# Patient Record
Sex: FEMALE | Race: BLACK OR AFRICAN AMERICAN | Employment: FULL TIME | URBAN - METROPOLITAN AREA
[De-identification: names, ages, dates, MRNs, and addresses within clinical notes are randomized per-mention and may not be internally consistent; named-entity substitution may affect disease eponyms.]

---

## 2018-09-13 ENCOUNTER — DOCUMENTATION ONLY (OUTPATIENT)
Dept: SURGERY | Age: 53
End: 2018-09-13

## 2018-09-13 NOTE — LETTER
[facility] [place] [facility] 
 
 
Dear Patient, Your health is our main concern. It is important for your health to have follow-up lab work and to see you surgeon at 2 months, 4 months, 6 months, 9 months and annually after your weight loss surgery. Additionally, the Department of Bariatric Surgery at our hospital is a member of the [employer] [address] Surgical Quality Improvement Program ([facility] NSQIP). As a participant in this program, we gather information on the outcomes of our patients after surgery. Please call the office for a follow up appointment at [phone]. If you have moved out of the area or have changed surgeons please call us and let us know the name of your doctor. Your health and feedback are important to us. We greatly appreciate your response. Thank you, [facility]

## 2018-10-29 ENCOUNTER — TELEPHONE (OUTPATIENT)
Dept: SURGERY | Age: 53
End: 2018-10-29

## 2019-05-09 ENCOUNTER — DOCUMENTATION ONLY (OUTPATIENT)
Dept: SURGERY | Age: 54
End: 2019-05-09

## 2019-05-09 NOTE — LETTER
[facility] [place] [facility] 
 
 
Dear Patient, Your health is our main concern. It is important for your health to have follow-up lab work and to see you surgeon at 2 months, 4 months, 6 months, 9 months and annually after your weight loss surgery. Additionally, the Department of Bariatric Surgery at our hospital is a member of the [employer] [address] Surgical Quality Improvement Program ([facility] NSQIP). As a participant in this program, we gather information on the outcomes of our patients after surgery. Please call the office for a follow up appointment at [phone]. If you have moved out of the area or have changed surgeons please call us and let us know the name of your doctor. Your health and feedback are important to us. We greatly appreciate your response. Thank you, [facility]

## 2019-05-30 ENCOUNTER — DOCUMENTATION ONLY (OUTPATIENT)
Dept: SURGERY | Age: 54
End: 2019-05-30

## 2019-05-30 NOTE — PROGRESS NOTES
Per [facility] requirements;  E-mail sent for follow up appointment. [facility] [place]  [facility]  [facility]      Dear Patient,    Your health is our main concern. It is important for your health to have follow-up lab work and to see you surgeon at 2 months, 4 months, 6 months, 9 months and annually after your weight loss surgery. Additionally, the Department of Bariatric Surgery at our hospital is a member of the [employer] [address] Surgical Quality Improvement Program ([facility] NSQIP). As a participant in this program, we gather information on the outcomes of our patients after surgery. Please call the office for a follow up appointment at [phone]. If you have moved out of the area or have changed surgeons please call us and let us know the name of your doctor. Your health and feedback are important to us. We greatly appreciate your response.        Thank you,  [facility]

## 2021-05-04 ENCOUNTER — HOSPITAL ENCOUNTER (OUTPATIENT)
Dept: PHYSICAL THERAPY | Age: 56
Discharge: HOME | End: 2021-05-04
Payer: COMMERCIAL

## 2021-05-04 PROCEDURE — 97140 MANUAL THERAPY 1/> REGIONS: CPT

## 2021-05-04 PROCEDURE — 97162 PT EVAL MOD COMPLEX 30 MIN: CPT

## 2021-05-04 PROCEDURE — 97110 THERAPEUTIC EXERCISES: CPT

## 2021-05-04 NOTE — PROGRESS NOTES
PT DAILY TREATMENT NOTE     Patient Name: Damian De Los Santos  Date:2021  : 1965  [x]  Patient  Verified  Payor: Shreya Alex / Plan: [facility] [employer] PPO / Product Type: PPO /    In time:832a  Out time:2a  Total Treatment Time (min): 50  Visit #: 1 of     Medicare/[employer] Only   Total Timed Codes (min):  23 1:1 Treatment Time:  50       Treatment Area: Right knee pain [M25.561]    SUBJECTIVE  Pain Level (0-10 scale): 3  Any medication changes, allergies to medications, adverse drug reactions, diagnosis change, or new procedure performed?: [x] No    [] Yes (see summary sheet for update)  Subjective functional status/changes:   [] No changes reported  Pt initial eval see POC for details    OBJECTIVE    Modality rationale: decrease pain to improve the patient's ability to tolerate ADLs   Min Type Additional Details    [] Estim:  []Unatt       []IFC  []Premod                        []Other:  []w/ice   []w/heat  Position:  Location:    [] Estim: []Att    []TENS instruct  []NMES                    []Other:  []w/US   []w/ice   []w/heat  Position:  Location:    []  Traction: [] Cervical       []Lumbar                       [] Prone          []Supine                       []Intermittent   []Continuous Lbs:  [] before manual  [] after manual    []  Ultrasound: []Continuous   [] Pulsed                           []1MHz   []3MHz W/cm2:  Location:    []  Iontophoresis with dexamethasone         Location: [] Take home patch   [] In clinic   PD []  Ice     []  heat  []  Ice massage  []  Laser   []  Anodyne Position:  Location:    []  Laser with stim  []  Other:  Position:  Location:    []  Vasopneumatic Device Pressure:       [] lo [] med [] hi   Temperature: [] lo [] med [] hi   [] Skin assessment post-treatment:  []intact []redness- no adverse reaction    []redness  adverse reaction:     27 min [x]Eval                  []Re-Eval       13 min Therapeutic Exercise:  [] See flow sheet : Rationale: increase ROM and increase strength to improve the patient's ability to transfer and navigate stairs    10 min Manual Therapy:  Gentle effleurage to dec swelling, gentle flex/ext stretching, GrII posterior tibial glide to improve flexion   The manual therapy interventions were performed at a separate and distinct time from the therapeutic activities interventions. Rationale: decrease pain, increase ROM and increase tissue extensibility to improve tissue excursion during functional mobility and ADLs              With   [] TE   [] TA   [] neuro   [] other: Patient Education: [x] Review HEP    [] Progressed/Changed HEP based on:   [] positioning   [] body mechanics   [] transfers   [] heat/ice application    [] other:      Other Objective/Functional Measures:    Justification for Eval Code Complexity:  Patient History : see POC   Examination see exam   Clinical Presentation: evolving  Clinical Decision Making : FOTO : 56/100    Pain Level (0-10 scale) post treatment: 3    ASSESSMENT/Changes in Function: Pt was instructed in initial HEP required demo, vc, and tc for all exercises to perform correctly. Pt was given hand out detailing exercises and instructed in modification of exercises to tolerance, and in performing exercises safely. Pt verbalized understanding. Patient will continue to benefit from skilled PT services to modify and progress therapeutic interventions, address functional mobility deficits, address ROM deficits, address strength deficits, analyze and address soft tissue restrictions, analyze and cue movement patterns, analyze and modify body mechanics/ergonomics, assess and modify postural abnormalities, address imbalance/dizziness and instruct in home and community integration to attain remaining goals.      [x]  See Plan of Care  []  See progress note/recertification  []  See Discharge Summary         Progress towards goals / Updated goals:  No progress as goals were set today    PLAN  []  Upgrade activities as tolerated     []  Continue plan of care  []  Update interventions per flow sheet       []  Discharge due to:_  []  Other:_        Manuelito Bruno 5/4/2021  8:34 AM    No future appointments.

## 2021-05-04 NOTE — PROGRESS NOTES
[address] [facility] AT [address], [address] [place] [address] Road  Phone: [phone]   [phone]  PLAN OF CARE / [address] [place] [address] PHYSICAL THERAPY SERVICES  Patient Name: Meseret Hayden : 1965   Medical   Diagnosis: Right knee pain [M25.561] Treatment Diagnosis: Right knee pain [M25.561]   Onset Date: ~2021     Referral Source: Rick Veliz DO Start of Care [facility]): 2021   Prior Hospitalization: See medical history Provider #: [id number]   Prior Level of Function: Walked 5 miles daily, able to transfer and perform all functional mobility without pain/restriction independently   Comorbidities: Osteoporosis, OA, HTN   Medications: Verified on Patient Summary List   The Plan of Care and following information is based on the information from the initial evaluation.   ===========================================================================================  Assessment / key information:  Pt is a 60 y.o. F who presents with R knee pain. Current deficits include: dec knee ROM, dec hip ROM, dec LE strength, dec LE stability throughout the kinetic chain, swelling in R knee Functional deficits include: impaired transfers, ambulation, navigating stairs, sleep disturbances due to pain. FOTO score indicates 44% functional impairment. Home exercise program initiated on initial evaluation to address these deficits. Pt would benefit from PT to address these deficits for increased functional mobility and QOL. DWAIN:Pt reports over the last few months (3 months) her knee became sore and has gotten progressively worse. It has become more swollen and painful. She denies any hx of injury to the R knee or prior occurrence of pain in the knee. She saw her MD who did an x-ray on 4/10/21 which she reports revealed arthritis. She describes the pain like a dull ache.  It wakes her 3+ times per night, prevents her from walking, go up stairs, and makes it difficult to get in and out of chairs. Prior to this she was able to walk 5 miles daily, sleep through the night, and get out of chairs without a problem. The pain is worse with all activity at this point. It is better with naproxen, however she prefers to avoid taking it if possible due to a kidney transplant, and changing position. 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 PAIN:  Location- R knee  Current- 3  Best- 3  Worst-10  Alleviating factors: naproxen, changing position  Aggravating factors: inc activity    OBJECTIVE:    MMT:  Hip   - flex L=3/5 R=3/5!  - ext L=3/5 R=3-/5  - abd L=3/5 R=3-/5  Knee   - flex L=3/5 R=3/5!  - ext L=3/5 R=3/5! Balance: SLS  R=3s  L= 10s    Knee AROM: R=0-114 deg, L=0-126 deg    Outcome Measure: FOTO=56/100    Accessory ROM: dec patellar mobility in all directions with boggy end feel    Swelling: circumferential R=48cm, L=44.5cm  Palpation:Mod TTP of medial and lateral joint lines, patellar tendon, patellar borders, and distal quad, moderate to min TTP of distal hamstrings    Special Tests:  - Varus/valgus stress test not tolerated due to pain          ===========================================================================================  Eval Complexity: History HIGH Complexity :3+ comorbidities / personal factors will impact the outcome/ POC ;  Examination  MEDIUM Complexity : 3 Standardized tests and measures addressing body structure, function, activity limitation and / or participation in recreation ; Presentation MEDIUM Complexity : Evolving with changing characteristics ;   Decision Making MEDIUM Complexity : FOTO score of 26-74; Overall Complexity MEDIUM  Problem List: pain affecting function, decrease ROM, decrease strength, edema affecting function, impaired gait/ balance, decrease ADL/ functional abilitiies, decrease activity tolerance, decrease flexibility/ joint mobility and decrease transfer abilities   Treatment Plan may include any combination of the following: Therapeutic exercise, Therapeutic activities, Neuromuscular re-education, Physical agent/modality, Gait/balance training, Manual therapy, Patient education, Self Care training, Functional mobility training, Home safety training and Stair training  Patient / Family readiness to learn indicated by: asking questions  Persons(s) to be included in education: patient (P)  Barriers to Learning/Limitations: None  Measures taken, if barriers to learning:    Patient Goal (s): Possibility of doing away w/pain&swelling in order to get back to normal activities   Patient self reported health status: good  Rehabilitation Potential: good  Short Term Goals: To be accomplished in 1 weeks:  1) Goal: Patient will be independent and compliant with HEP in order to progress toward long term goals. Status at last note/certification: issued and reviewed  Long Term Goals: To be accomplished in 8-12 treatments:  1) Goal: Patient will improve FOTO assessment score to 72 pts in order to indicate improved functional abilities. Status at last note/certification: 94PRI  2) Goal: Patient will improve R knee ROM  to 0-126 deg in order to descend stairs  Status at last note/certification: H=2-594 deg,  3) Goal: Patient will report worst knee pain as 2/10 or less in order to progress toward personal goals. Status at last note/certification: 67/99  4) Goal: Patient will report overall at least 75% improvement in function in order to progress toward premorbid status. Status at last note/certification: n/a  5) Goal: Patient will report ability to walk for 5 miles for general health  Status at last note/certification: unable   6) Goal: Patient will demonstrate >=5/5 strength in R quad for improved ability to navigate stairs  Status at last note/certification:  W=6/3!     Frequency / Duration:   Patient to be seen  1-2  times per week for 4-6  weeks:  Patient / Caregiver education and instruction: exercises  Therapist Signature: Aneudy Strickland Date: 2/7/2527   Certification Period: na Time: 8:34 AM   ===========================================================================================  I certify that the above Physical Therapy Services are being furnished while the patient is under my care. I agree with the treatment plan and certify that this therapy is necessary. Physician Signature:        Date:       Time:        Yasmany Porras,   Please sign and return to [facility] or you may fax the signed copy to [phone]. Thank you.

## 2021-05-06 ENCOUNTER — HOSPITAL ENCOUNTER (OUTPATIENT)
Dept: PHYSICAL THERAPY | Age: 56
Discharge: HOME | End: 2021-05-06
Payer: COMMERCIAL

## 2021-05-06 PROCEDURE — 97140 MANUAL THERAPY 1/> REGIONS: CPT

## 2021-05-06 PROCEDURE — 97110 THERAPEUTIC EXERCISES: CPT

## 2021-05-11 ENCOUNTER — HOSPITAL ENCOUNTER (OUTPATIENT)
Dept: PHYSICAL THERAPY | Age: 56
Discharge: HOME | End: 2021-05-11
Payer: COMMERCIAL

## 2021-05-11 PROCEDURE — 97140 MANUAL THERAPY 1/> REGIONS: CPT

## 2021-05-11 PROCEDURE — 97110 THERAPEUTIC EXERCISES: CPT

## 2021-05-11 NOTE — PROGRESS NOTES
PHYSICAL THERAPY - DAILY TREATMENT NOTE    Patient Name: Gentry Mccallum        Date: 2021  : 1965   yes Patient  Verified  Visit #:   3   of     Insurance: Payor: Natty Blum / Plan: [address] PPO / Product Type: PPO /      In time: 5:16 Out time: 6:07   Total Treatment Time: 51     Medicare/[facility] Time Tracking (below)   Total Timed Codes (min):  na 1:1 Treatment Time:  na     TREATMENT AREA =  Right knee pain [M25.561]    SUBJECTIVE  Pain Level (on 0 to 10 scale):  4  / 10   Medication Changes/New allergies or changes in medical history, any new surgeries or procedures?    no  If yes, update Summary List   Subjective Functional Status/Changes:  []  No changes reported     Pt reports compliance with HEP. Feels her knee is gradually improving       OBJECTIVE    41 min Therapeutic Exercise:  [x]  See flow sheet   Rationale:      increase ROM and increase strength to improve the patients ability to ambulate, transfer     10 min Manual Therapy: Patella mobs all planes, grade 3 inf patellar mob with post tib mob. IASTM to pes anserine, medial hamstrings, medial gastroc, popliteal fossa, patellar/quad tendons. Rationale:      decrease pain, increase ROM and increase tissue extensibility to improve patient's ability to ambulate  The manual therapy interventions were performed at a separate and distinct time from the therapeutic activities interventions.       Billed With/As:   [x] TE   [] TA   [] Neuro   [] Self Care Patient Education: [x] Review HEP    [] Progressed/Changed HEP based on:   [] positioning   [] body mechanics   [] transfers   [] heat/ice application    [] other:      Other Objective/Functional Measures:  -added s/l hip add and manually resisted knee flex/ext in prone     Post Treatment Pain Level (on 0 to 10) scale:   0  / 10     ASSESSMENT  Assessment/Changes in Function:   Pt demonstrates good improvement in reduced pain with daily activities, increased knee flexion ROM to now A/P 120/125 (after manual and exercises), and decreasing knee edema per pt report. Pt with lateral tracking patella on QS and will benefit from increased cueing for VMO activation. Pt able to perform SLR with slight extensor lag and when cued for QS unable to complete due to sharp knee pain. Will need continued 1:1 monitoring with this to promote strengthening. []  See Progress Note/Recertification   Patient will continue to benefit from skilled PT services to modify and progress therapeutic interventions, address functional mobility deficits, address ROM deficits, address strength deficits, analyze and address soft tissue restrictions, analyze and cue movement patterns, analyze and modify body mechanics/ergonomics, assess and modify postural abnormalities and instruct in home and community integration to attain remaining goals. Progress toward goals / Updated goals:    Short Term Goals: To be accomplished in 1 weeks:  1) Patient will be independent and compliant with HEP in order to progress toward long term goals. Goal in progress, reports compliance to initial HEP 5/6/2021  Long Term Goals: To be accomplished in 8-12 treatments:  1) Patient will improve FOTO assessment score to 72 pts in order to indicate improved functional abilities. 2) Patient will improve R knee ROM  to 0-126 deg in order to descend stairs. -5/11: goal progressing, knee flex A/P 120/125 no pain. 3) Patient will report worst knee pain as 2/10 or less in order to progress toward personal goals. Status at last note/certification: 44/72  4.) Patient will report overall at least 75% improvement in function in order to progress toward premorbid status.   5) Patient will report ability to walk for 5 miles for general health  6) Patient will demonstrate >=5/5 strength in R quad for improved ability to navigate stairs         PLAN  []  Upgrade activities as tolerated yes Continue plan of care   []  Discharge due to : []  Other:      Therapist: Edin Melgar.  Richard Steven, PT    Date: 5/11/2021 Time: 6:18 PM      Future Appointments   Date Time Provider Sampson Garg   5/11/2021  6:00 PM Glo Allen., PT [facility]   5/18/2021  5:00 PM [facility] PT [place] 1 [facility]   5/25/2021  5:15 PM Glo Allen., PT [facility]   6/2/2021  7:00 AM Alejo ROSA, PT [facility]   6/10/2021  7:00 AM Joelle Galvez, PTA [facility]   6/15/2021  7:00 AM Joelle Galvez, PTA [facility]   6/17/2021  7:00 AM Joelle Galvez, PTA [facility]   6/21/2021  7:00 AM Alejo ROSA, PT [facility]   6/23/2021  7:00 AM Alejo ROSA, PT [facility]   6/29/2021  7:00 AM Joelle Galvez, PTA [facility]

## 2021-05-18 ENCOUNTER — HOSPITAL ENCOUNTER (OUTPATIENT)
Dept: PHYSICAL THERAPY | Age: 56
Discharge: HOME | End: 2021-05-18
Payer: COMMERCIAL

## 2021-05-18 PROCEDURE — 97140 MANUAL THERAPY 1/> REGIONS: CPT

## 2021-05-18 PROCEDURE — 97110 THERAPEUTIC EXERCISES: CPT

## 2021-05-18 NOTE — PROGRESS NOTES
PHYSICAL THERAPY - DAILY TREATMENT NOTE    Patient Name: Elliott Nogueira        Date: 2021  : 1965   yes Patient  Verified  Visit #:   4     Insurance: Payor: Zuly Peters / Plan: [address] PPO / Product Type: PPO /      In time: 5:00 PM Out time: 5:54   Total Treatment Time: 54     Medicare/[facility] Time Tracking (below)   Total Timed Codes (min):  na 1:1 Treatment Time:  na     TREATMENT AREA =  Right knee pain [M25.561]    SUBJECTIVE  Pain Level (on 0 to 10 scale):  0  / 10   Medication Changes/New allergies or changes in medical history, any new surgeries or procedures?    no  If yes, update Summary List   Subjective Functional Status/Changes:  []  No changes reported   Pt reports compliance with her HEP. States her pain has been improved for a few days now. Doing all normal activities with good tolerance. Reports decreased pain now with ascending stairs. OBJECTIVE    39 min Therapeutic Exercise:  [x]  See flow sheet (-5 min bike)   Rationale:      increase ROM and increase strength to improve the patients ability to ambulate, transfer     10 min Manual Therapy: Patella mobs all planes, grade 3 inf patellar mob with post tib mob. IASTM to patellar/quad tendons. Manual quad/ITB stretching in ching position   Rationale:      decrease pain, increase ROM and increase tissue extensibility to improve patient's ability to ambulate  The manual therapy interventions were performed at a separate and distinct time from the therapeutic activities interventions.       Billed With/As:   [x] TE   [] TA   [] Neuro   [] Self Care Patient Education: [x] Review HEP    [] Progressed/Changed HEP based on:   [] positioning   [] body mechanics   [] transfers   [] heat/ice application    [] other:      Other Objective/Functional Measures:  -increased reps with SLR 4 way; pt ed to increase to 2x10 for HEP   Post Treatment Pain Level (on 0 to 10) scale:   0  / 10 ASSESSMENT  Assessment/Changes in Function:   Pt demonstrates improving quad control and decreased knee pain as she is able to perform SLR flexion today without pain on QS vs last week unable. No tttp to medial hamstrings/pes anserine today; moderate soft tissue congestion and edema to quad tendon, and supra/infrapatellar regions. Pt advancing appropriately with LE strength and pain free ROM today. Will plan to progress as per POC NV with increased HEP as appropriate. []  See Progress Note/Recertification   Patient will continue to benefit from skilled PT services to modify and progress therapeutic interventions, address functional mobility deficits, address ROM deficits, address strength deficits, analyze and address soft tissue restrictions, analyze and cue movement patterns, analyze and modify body mechanics/ergonomics, assess and modify postural abnormalities and instruct in home and community integration to attain remaining goals. Progress toward goals / Updated goals:    Short Term Goals: To be accomplished in 1 weeks:  1) Patient will be independent and compliant with HEP in order to progress toward long term goals. Goal in progress, reports compliance to initial HEP 5/6/2021.  -5/18: goal progressing reports performing 10 reps all exercises (not 10x3 as per handout)  [address] [facility] be accomplished in 8-12 treatments:  1) Patient will improve FOTO assessment score to 72 pts in order to indicate improved functional abilities. 2) Patient will improve R knee ROM  to 0-126 deg in order to descend stairs. -5/11: goal progressing, knee flex A/P 120/125 no pain. 3) Patient will report worst knee pain as 2/10 or less in order to progress toward personal goals. Status at last note/certification: 07/72  -5/18: goal progressing; max pain 3/10 this week.   No pain for a couple days per pt report  4.) Patient will report overall at least 75% improvement in function in order to progress toward premorbid status. 5) Patient will report ability to walk for 5 miles for general health  6) Patient will demonstrate >=5/5 strength in R quad for improved ability to navigate stairs         PLAN  []  Upgrade activities as tolerated yes Continue plan of care   []  Discharge due to :    []  Other:      Therapist: William Claudio.  Julia Denis, PT    Date: 5/18/2021 Time: 5:56 PM      Future Appointments   Date Time Provider Sampson Garg   5/18/2021  5:00 PM [place]., PT [facility]   5/25/2021  5:15 PM [place]., PT [facility]   6/2/2021  7:00 AM Marge ROSA, PT [facility]   6/10/2021  7:00 AM Ariana Shearer, PTA [facility]   6/15/2021  7:00 AM Ariana Shearer, PTA [facility]   6/17/2021  7:00 AM Ariana Shearer, PTA [facility]   6/21/2021  7:00 AM Marge ROSA, PT [facility]   6/23/2021  7:00 AM Marge ROSA, PT [facility]   6/29/2021  7:00 AM Ariana Shearer, PTA [facility]

## 2021-05-25 ENCOUNTER — APPOINTMENT (OUTPATIENT)
Dept: PHYSICAL THERAPY | Age: 56
End: 2021-05-25
Payer: COMMERCIAL

## 2021-06-02 ENCOUNTER — HOSPITAL ENCOUNTER (OUTPATIENT)
Dept: PHYSICAL THERAPY | Age: 56
Discharge: HOME | End: 2021-06-02
Payer: COMMERCIAL

## 2021-06-02 PROCEDURE — 97530 THERAPEUTIC ACTIVITIES: CPT

## 2021-06-02 PROCEDURE — 97110 THERAPEUTIC EXERCISES: CPT

## 2021-06-02 NOTE — PROGRESS NOTES
[facility] PHYSICAL THERAPY  [address], [place], [address] - Phone: [phone]  Fax: [phone]  PROGRESS NOTE  Patient Name: Brooks Celestin : 1965   Treatment/Medical Diagnosis: Right knee pain [M25.561]   Referral Source: Pascual Santos DO     Date of Initial Visit: 21 Attended Visits: 5 Missed Visits: 1     SUMMARY OF TREATMENT  Physical therapy has consisted of therapeutic exercises for increased core/LE strength, ROM, and flexibility. Manual therapy for decreased muscle hypertonicity and increased ROM/flexibility. Pt education provided regarding HEP. CURRENT STATUS  Pt reports 90% overall improvement in sx since beginning care. Pt reports 3/10 max pain, 3/10 avg pain. Pain made worse with ascending stairs, at night and start up pain after prolonged sitting. Pt has been able to demonstrate significant objective improvement in LE strength both as per MMT increase from at least 2/3 to 2 grades, as well as significant functional improvement as per ability to perform stair tap downs/step ups and standing squats pain free today. Improvements: improved ease with stairs, increased walking tolerance (at [facility] <1 mile tolerance, and currently up to 3 miles; where previous baseline= 5 mi). Improved ease with sit to stand transfers. Remaining functional limitations: occasional crepitus with ascending stairs, initial start up pain after prolonged sitting     Objective Measurements:  STRENGTH:  Hip flex 4-/5, Abd 4/5, Ext 4+/5;  Knee Ext 4+/5, Flex 5/5. Bridge fair to 50%  EDEMA:  Circumferential patella R 48.6 cm, L 46 cm;  5 cm inferior: R 44, L 42.5 cm  Functional Assessment: standing squat to 70 deg without pain; good mechanics. Pt reports waking ~ 2x/night due to knee pain (SOC waking ~ 3 x/night) \"It's almost like the whole leg and not just the knee; it's like a dull leg\". Step ups to 8 inch height with good form x30 reps pain free. Lateral tap down off 6 inch height with glute med weakness noted, no pain. FOTO 72/100 (+16 point change from [facility])    Goal/Measure of Progress Goal Met? 1. Patient will be independent and compliant with HEP in order to progress toward long term goals. Status at last Eval: Issued at eval Current Status: Not compliant ongoing   2. Patient will improve FOTO assessment score to 72 pts in order to indicate improved functional abilities. Status at last Eval: 56 Current Status: 72 yes   3. Patient will improve R knee ROM  to 0-126 deg in order to descend stairs. Status at last Eval: 0-114 deg Current Status: 0- 123/127 (A/P) Met for PROM, not met for AROM     4. Patient will report worst knee pain as 2/10 or less in order to progress toward personal goals. Status at last Eval: 10/10 Current Status: 3/10 (when lying down to sleep) Nearly met   5. Patient will report overall at least 75% improvement in function in order to progress toward premorbid status. Status at last Eval: n/a Current Status: 90% yes   6. Patient will report ability to walk for 5 miles for general health    Status at last Eval: <1 mile Current Status: 3 miles, no pain issues progressing     7. Patient will demonstrate >=5/5 strength in R quad for improved ability to navigate stairs   Status at last Eval: 3/5 P! Current Status: 4+/5 progressing     New Goals to be achieved in __4__  weeks:  1) Patient will be independent and compliant with HEP in order to progress toward long term goals. 2) Patient will demonstrate >=5/5 strength in R quad for improved ability to navigate stairs  3) Patient will report worst knee pain as 2/10 or less in order to progress toward personal goals. 4) Patient will report ability to walk for 5 miles for general health    RECOMMENDATIONS  Pt will benefit from skilled progression of PT at 1-2 x/wk for additional 4 weeks to attain LTG's and improve overall QOL.      If you have any questions/comments please contact us directly at [phone]. Thank you for allowing us to assist in the care of your patient. Therapist Signature: Dianne Pandey. THOMAS Shrestha Date: 6/2/2021     Time: 7:09 AM   NOTE TO PHYSICIAN:  PLEASE COMPLETE THE ORDERS BELOW AND FAX TO   [facility]: [phone]  If you are unable to process this request in 24 hours please contact our office: [phone]    ___ I have read the above report and request that my patient continue as recommended.   ___ I have read the above report and request that my patient continue therapy with the following changes/special instructions:_________________________________________________________   ___ I have read the above report and request that my patient be discharged from therapy.      Physician Signature:        Date:       Time:       Thom Winters DO

## 2021-06-02 NOTE — PROGRESS NOTES
PHYSICAL THERAPY - DAILY TREATMENT NOTE    Patient Name: Suzy Jones        Date: 2021  : 1965   yes Patient  Verified  Visit #:   5     Insurance: Payor: Christian Hearder / Plan: [address] PPO / Product Type: PPO /      In time: 7:05 Out time: 8:00   Total Treatment Time: 55     Medicare/[facility] Time Tracking (below)   Total Timed Codes (min):  na 1:1 Treatment Time:  na     TREATMENT AREA =  Right knee pain [M25.561]    SUBJECTIVE  Pain Level (on 0 to 10 scale):  0  / 10   Medication Changes/New allergies or changes in medical history, any new surgeries or procedures?    no  If yes, update Summary List   Subjective Functional Status/Changes:  []  No changes reported   See PN       OBJECTIVE    38 min Therapeutic Exercise:  [x]  See flow sheet (-5 min bike)   Rationale:      increase ROM and increase strength to improve the patients ability to ambulate, transfer     12 min Therapeutic Activity:  Squats, step ups, lateral tap downs   Rationale: to improve functional activities, model real life movements and performance specific to the patient's need with supervision to return the patient to their PLOF       Billed With/As:   [x] TE   [] TA   [] Neuro   [] Self Care Patient Education: [x] Review HEP    [] Progressed/Changed HEP based on:   [] positioning   [] body mechanics   [] transfers   [] heat/ice application    [] other:      Other Objective/Functional Measures:  -progressed TG squats to standing  -increased reps with Heel raises  -added monster walks  -progressed side steps to GMB  -progressed lateral step down to lateral tap down  -advanced to 8 inch step ups     Post Treatment Pain Level (on 0 to 10) scale:   0  / 10     ASSESSMENT  Assessment/Changes in Function:   Pt with poor closed chain glute med stability in lateral tap down.  Pt with significant neuro-muscular discoordination with backwards band walks R>L LE requiring increased cues to avoid hip ER and increase glute med activation. Refer to PN for further assessments. []  See Progress Note/Recertification   Patient will continue to benefit from skilled PT services to modify and progress therapeutic interventions, address functional mobility deficits, address ROM deficits, address strength deficits, analyze and address soft tissue restrictions, analyze and cue movement patterns, analyze and modify body mechanics/ergonomics, assess and modify postural abnormalities and instruct in home and community integration to attain remaining goals. Progress toward goals / Updated goals:    See PN       PLAN  []  Upgrade activities as tolerated yes Continue plan of care   []  Discharge due to :    []  Other:      Therapist: Thania Moore.  Gladys Israel, PT    Date: 6/2/2021 Time: 8:27 AM      Future Appointments   Date Time Provider Sampson Garg   6/10/2021  7:00 AM Filipe Gutierrez, ZEKE [facility]   6/15/2021  7:00 AM Filipe Gutierrez, PTA [facility]   6/17/2021  7:00 AM Filipe Gutierrez, ZEKE [facility]   6/21/2021  7:00 AM Sejal ROSA, PT [facility]   6/23/2021  7:00 AM Sejal ROSA, PT [facility]   6/29/2021  7:00 AM Filipe Gutierrez, ZEKE [facility]

## 2021-06-10 ENCOUNTER — HOSPITAL ENCOUNTER (OUTPATIENT)
Dept: PHYSICAL THERAPY | Age: 56
Discharge: HOME | End: 2021-06-10
Payer: COMMERCIAL

## 2021-06-10 PROCEDURE — 97110 THERAPEUTIC EXERCISES: CPT

## 2021-06-10 NOTE — PROGRESS NOTES
PHYSICAL THERAPY - DAILY TREATMENT NOTE    Patient Name: Maldonado Cormier        Date: 6/10/2021  : 1965   yes Patient  Verified  Visit #:     Insurance: Payor: Zuleika Shown / Plan: [address] PPO / Product Type: PPO /      In time: 7:03 Out time: 7:58   Total Treatment Time: 55     Medicare/[facility] Time Tracking (below)   Total Timed Codes (min):   1:1 Treatment Time:       TREATMENT AREA =  Right knee pain [M25.561]    SUBJECTIVE  Pain Level (on 0 to 10 scale):  0  / 10   Medication Changes/New allergies or changes in medical history, any new surgeries or procedures?    no  If yes, update Summary List   Subjective Functional Status/Changes:  []  No changes reported   My knee has been doing pretty good since I was here last, I haven't really had any pain since I went up and down a bunch of stairs when I was in [facility] last week. OBJECTIVE    55 min Therapeutic Exercise:  [x]  See flow sheet   Rationale:      increase ROM, increase strength, improve balance and increase proprioception to improve the patients ability to improve functional abilities      Billed With/As:   [] TE   [] TA   [] Neuro   [] Self Care Patient Education: [x] Review HEP    [] Progressed/Changed HEP based on:   [] positioning   [] body mechanics   [] transfers   [] heat/ice application    [] other:      Other Objective/Functional Measures:     Post Treatment Pain Level (on 0 to 10) scale:   0  / 10     ASSESSMENT  Assessment/Changes in Function:   Pt presented with minimal reoccurrence of knee pain/symptoms since last treatment except for with repetitive stair activity. Pt although had to regress from 6\" to 4\" step with lateral tap downs with addition of RNT as well as from full standing squats to [employer] due to losing form/control. Will continue to progress/advance patient within current POC as tolerated with monitoring symptoms.      []  See Progress Note/Recertification   Patient will continue to benefit from skilled PT services to modify and progress therapeutic interventions, address functional mobility deficits, address ROM deficits, address strength deficits, analyze and cue movement patterns and instruct in home and community integration to attain remaining goals. Progress toward goals / Updated goals:  New Goals to be achieved in __4__  weeks:  1) Patient will be independent and compliant with HEP in order to progress toward long term goals. 2) Patient will demonstrate >=5/5 strength in R quad for improved ability to navigate stairs  3) Patient will report worst knee pain as 2/10 or less in order to progress toward personal goals.   4) Patient will report ability to walk for 5 miles for general health 6/10/21: Not Met, progressing, pt has increased to 3 miles with community walking program since last treatment     PLAN  [x]  Upgrade activities as tolerated yes Continue plan of care   []  Discharge due to :    []  Other:      Therapist: Dale Acevedo PTA    Date: 6/10/2021 Time: 7:07 AM     Future Appointments   Date Time Provider Sampson Garg   6/15/2021  7:00 AM John Nunes, ZEKE [facility] [address]   6/17/2021  7:00 AM John Nunes, PTA [facility] [address]   6/21/2021  7:00 AM Sabrina ROSA, PT [facility] [address]   6/23/2021  7:00 AM Sabrina ROSA, PT [facility] [address]   6/29/2021  7:00 AM John Nunes PTA [facility] [address]

## 2021-06-15 ENCOUNTER — APPOINTMENT (OUTPATIENT)
Dept: PHYSICAL THERAPY | Age: 56
End: 2021-06-15
Payer: COMMERCIAL

## 2021-06-17 ENCOUNTER — HOSPITAL ENCOUNTER (OUTPATIENT)
Dept: PHYSICAL THERAPY | Age: 56
Discharge: HOME | End: 2021-06-17
Payer: COMMERCIAL

## 2021-06-17 PROCEDURE — 97110 THERAPEUTIC EXERCISES: CPT

## 2021-06-17 NOTE — PROGRESS NOTES
PHYSICAL THERAPY - DAILY TREATMENT NOTE    Patient Name: Napoleon Ontiveros        Date: 2021  : 1965   yes Patient  Verified  Visit #:     Insurance: Payor: Richard He / Plan: [id number] [place] [address] OPTIONS PPO / Product Type: PPO /      In time: 7:01 Out time: 7:47   Total Treatment Time: 46     Medicare/[facility] Time Tracking (below)   Total Timed Codes (min):   1:1 Treatment Time:       TREATMENT AREA =  Right knee pain [M25.561]    SUBJECTIVE  Pain Level (on 0 to 10 scale):  0  / 10   Medication Changes/New allergies or changes in medical history, any new surgeries or procedures? yes  If yes, update Summary List   Subjective Functional Status/Changes:  []  No changes reported   My knee has been doing better because it hasn't been popping as much and I feel like I am able to go up and down the stairs without as much pain as before. OBJECTIVE    46 min Therapeutic Exercise:  [x]  See flow sheet   Rationale:      increase ROM, increase strength, improve balance and increase proprioception to improve the patients ability to improve functional abilities        Billed With/As:   [] TE   [] TA   [] Neuro   [] Self Care Patient Education: [x] Review HEP    [] Progressed/Changed HEP based on:   [] positioning   [] body mechanics   [] transfers   [] heat/ice application    [] other:      Other Objective/Functional Measures:  increased resistance with 4 way SLR series     Post Treatment Pain Level (on 0 to 10) scale:   0  / 10     ASSESSMENT  Assessment/Changes in Function:   Pt reports the most significant improvement with decreased popping/crepitus in her knee and no reoccurrence of pain/symptoms over the past week. Pt also presents with increased ease with ascending/descending stairs over the past 2-3 visits. Pt was also able to advance to increased resistance with 4 way SLR series with min to mod challenge today. Will continue to progress/advance patient within current POC as tolerated with monitoring symptoms. []  See Progress Note/Recertification   Patient will continue to benefit from skilled PT services to modify and progress therapeutic interventions, address functional mobility deficits, address ROM deficits, address strength deficits, analyze and address soft tissue restrictions, analyze and cue movement patterns and instruct in home and community integration to attain remaining goals. Progress toward goals / Updated goals:  New Goals to be achieved in __4__  weeks:  1) Patient will be independent and compliant with HEP in order to progress toward long term goals. 2) Patient will demonstrate >=5/5 strength in R quad for improved ability to navigate stairs  3) Patient will report worst knee pain as 2/10 or less in order to progress toward personal goals. 6/17/21: Met, Pt reports no reoccurrence of pain over the past week  4) Patient will report ability to walk for 5 miles for general health 6/10/21: Not Met, progressing, pt has increased to 3 miles with community walking program since last treatment     PLAN  [x]  Upgrade activities as tolerated yes Continue plan of care   []  Discharge due to :    []  Other:      Therapist: Eulalia Loja PTA    Date: 6/17/2021 Time: 6:33 AM     Future Appointments   Date Time Provider Sampson Garg   6/17/2021  7:00 AM Noam Marie PTA [facility]   6/21/2021  7:00 AM Virginia ROSA, PT [facility]   6/23/2021  7:00 AM Virginia ROSA, PT [facility]   6/29/2021  7:00 AM Noam Marie PTA [facility]

## 2021-06-21 ENCOUNTER — HOSPITAL ENCOUNTER (OUTPATIENT)
Dept: PHYSICAL THERAPY | Age: 56
Discharge: HOME | End: 2021-06-21
Payer: COMMERCIAL

## 2021-06-21 PROCEDURE — 97110 THERAPEUTIC EXERCISES: CPT

## 2021-06-21 NOTE — PROGRESS NOTES
PHYSICAL THERAPY - DAILY TREATMENT NOTE    Patient Name: Prabhu Alanis        Date: 2021  : 1965   yes Patient  Verified  Visit #:     Insurance: Payor: Jasbir Reynolds / Plan: [address] PPO / Product Type: PPO /      In time: 7:02 PM Out time: 7:59   Total Treatment Time: 57     Medicare/[facility] Time Tracking (below)   Total Timed Codes (min):  54 1:1 Treatment Time:       TREATMENT AREA =  Right knee pain [M25.561]    SUBJECTIVE  Pain Level (on 0 to 10 scale):  0  / 10   Medication Changes/New allergies or changes in medical history, any new surgeries or procedures? yes  If yes, update Summary List   Subjective Functional Status/Changes:  []  No changes reported     Pt reports went for a run yesterday and made it 0.25 mi before had to stop due to R knee pain. She doesn't feel like the knee swelled up.         OBJECTIVE    54 min Therapeutic Exercise:  [x]  See flow sheet  (-3 min lat x)   Rationale:      increase ROM, increase strength, improve balance and increase proprioception to improve the patients ability to improve functional abilities        Billed With/As:   [x] TE   [] TA   [] Neuro   [] Self Care Patient Education: [x] Review HEP    [] Progressed/Changed HEP based on:   [] positioning   [] body mechanics   [] transfers   [] heat/ice application    [x] other: updated HEP (see chart copy)     Other Objective/Functional Measures:  -advanced to Lat X warm up; pt noting fatigue after 3 min and had to stop  -advanced to standing squats with GMB for increased glute activation; mirror for biofeedback  -attempted 6 inch lat tap down, however pt unable to maintain form through full depth and maintained at 4 inch.  -advanced step ups to BOSU for increased proprioceptive/dynamic control  -bridge to 75% due to weakness     Post Treatment Pain Level (on 0 to 10) scale:   0  / 10     ASSESSMENT  Assessment/Changes in Function:   Pt requires moderate cues for proper form with band walks to improve core stability and glute activation. Pt able to advance to standing squats without pain report or adverse sx's. Pt ed on need for advancement with her HEP and clinic exercises to promote ability to resume normal running regimen. []  See Progress Note/Recertification   Patient will continue to benefit from skilled PT services to modify and progress therapeutic interventions, address functional mobility deficits, address ROM deficits, address strength deficits, analyze and address soft tissue restrictions, analyze and cue movement patterns and instruct in home and community integration to attain remaining goals. Progress toward goals / Updated goals:    New Goals to be achieved in __4__  weeks:  1) Patient will be independent and compliant with HEP in order to progress toward long term goals. -6/21: goal progressing; advanced HEP today  2) Patient will demonstrate >=5/5 strength in R quad for improved ability to navigate stairs  3) Patient will report worst knee pain as 2/10 or less in order to progress toward personal goals. 6/17/21: Met, Pt reports no reoccurrence of pain over the past week  4) Patient will report ability to walk for 5 miles for general health 6/10/21: Not Met, progressing, pt has increased to 3 miles with community walking program since last treatment     PLAN  [x]  Upgrade activities as tolerated yes Continue plan of care   []  Discharge due to :    []  Other:      Therapist: Rufina Barrios.  Jake Rudd, PT    Date: 6/21/2021 Time: 9:24 AM      Future Appointments   Date Time Provider Sampson Garg   6/21/2021  7:00 AM Rickey Wallace, PT [facility]   6/23/2021  7:00 AM Jeet ROSA, PT [facility]   6/29/2021  7:00 AM Pham Rousseau PTA [facility]

## 2021-06-23 ENCOUNTER — APPOINTMENT (OUTPATIENT)
Dept: PHYSICAL THERAPY | Age: 56
End: 2021-06-23
Payer: COMMERCIAL

## 2021-06-29 ENCOUNTER — HOSPITAL ENCOUNTER (OUTPATIENT)
Dept: PHYSICAL THERAPY | Age: 56
Discharge: HOME | End: 2021-06-29
Payer: COMMERCIAL

## 2021-06-29 PROCEDURE — 97110 THERAPEUTIC EXERCISES: CPT

## 2021-06-29 NOTE — PROGRESS NOTES
PHYSICAL THERAPY - DAILY TREATMENT NOTE    Patient Name: Malissa Corona        Date: 2021  : 1965   yes Patient  Verified  Visit #:     Insurance: Payor: Blayne Kennedy / Plan: [address] PPO / Product Type: PPO /      In time: 7:02 Out time: 8:01   Total Treatment Time: 59     Medicare/[facility] Time Tracking (below)   Total Timed Codes (min):   1:1 Treatment Time:       TREATMENT AREA =  Right knee pain [M25.561]    SUBJECTIVE  Pain Level (on 0 to 10 scale):  0  / 10   Medication Changes/New allergies or changes in medical history, any new surgeries or procedures?    no  If yes, update Summary List   Subjective Functional Status/Changes:  []  No changes reported   My knee has been doing a lot better, I was able to walk for 5 miles 2 days in a row, yesterday and  without any issues except for a little bit of muscle pain. OBJECTIVE    59 min Therapeutic Exercise:  [x]  See flow sheet   Rationale:        increase ROM, increase strength, improve balance and increase proprioception to improve the patients ability to improve functional abilities        Billed With/As:   [] TE   [] TA   [] Neuro   [] Self Care Patient Education: [x] Review HEP    [] Progressed/Changed HEP based on:   [] positioning   [] body mechanics   [] transfers   [] heat/ice application    [] other:      Other Objective/Functional Measures:    addition of SLS and STAR taps on foam as well as increased resistance with mini band sidesteps     Post Treatment Pain Level (on 0 to 10) scale:   0  / 10     ASSESSMENT  Assessment/Changes in Function:   Pt presents with the most functional improvement with the ability to increase to 5 miles for 2 consecutive days with community walking program since last treatment with only c/o mild short term quad pain afterwards.  Pt was also able to advance to addition of SLS and STAR taps on foam as well as increased resistance with mini band sidesteps with moderate challenge today. Pt also demonstrated improved eccentric quad control with lateral step tap downs on 4\" step without the need of RNT with theraband today. Will continue to progress/advance patient within current POC as tolerated with monitoring symptoms. []  See Progress Note/Recertification   Patient will continue to benefit from skilled PT services to modify and progress therapeutic interventions, address functional mobility deficits, address ROM deficits, address strength deficits, analyze and cue movement patterns and instruct in home and community integration to attain remaining goals. Progress toward goals / Updated goals:  New Goals to be achieved in __4__  weeks:  1) Patient will be independent and compliant with HEP in order to progress toward long term goals. -6/21: goal progressing; advanced HEP today  2) Patient will demonstrate >=5/5 strength in R quad for improved ability to navigate stairs  3) Patient will report worst knee pain as 2/10 or less in order to progress toward personal goals. 6/17/21: Met, Pt reports no reoccurrence of pain over the past week  4) Patient will report ability to walk for 5 miles for general health 6/29/21:  Met, progressing, pt has increased to 5 miles for 2 consecutive days with community walking program since last treatment     PLAN  [x]  Upgrade activities as tolerated yes Continue plan of care   []  Discharge due to :    []  Other:      Therapist: Wayne Akers PTA    Date: 6/29/2021 Time: 7:09 AM     No future appointments.

## 2021-08-09 NOTE — PROGRESS NOTES
[facility] PHYSICAL THERAPY  [address], [place], [address] - Phone: [phone]  Fax: [phone]  DISCHARGE SUMMARY  Patient Name: Jaz Zaman : 1965   Treatment/Medical Diagnosis: Right knee pain [M25.561]   Referral Source: Salazar Overall, DO     Date of Initial Visit: 21 Attended Visits: 9 Missed Visits: 1     SUMMARY OF TREATMENT  Physical therapy has consisted of therapeutic exercises for increased core/LE strength, ROM, and flexibility. Manual therapy for decreased muscle hypertonicity and increased ROM/flexibility. Pt education provided regarding HEP. CURRENT STATUS   Pt reports 90% overall improvement in sx since beginning care. Pt reports 3/10 max pain, 3/10 avg pain. Pain made worse with ascending stairs, at night and start up pain after prolonged sitting. Pt has been able to demonstrate significant objective improvement in LE strength both as per MMT increase from at least 2/3 to 2 grades, as well as significant functional improvement as per ability to perform stair tap downs/step ups and standing squats pain free today.     Improvements: improved ease with stairs, increased walking tolerance (at [facility] <1 mile tolerance, and currently up to 3 miles; where previous baseline= 5 mi). Improved ease with sit to stand transfers. Remaining functional limitations: occasional crepitus with ascending stairs, initial start up pain after prolonged sitting    Objective:   STRENGTH:  Hip flex 4-/5, Abd 4/5, Ext 4+/5;  Knee Ext 4+/5, Flex 5/5. Bridge fair to 50%  EDEMA:  Circumferential patella R 48.6 cm, L 46 cm;  5 cm inferior: R 44, L 42.5 cm  Functional Assessment: standing squat to 70 deg without pain; good mechanics. Pt reports waking ~ 2x/night due to knee pain (SOC waking ~ 3 x/night) \"It's almost like the whole leg and not just the knee; it's like a dull leg\". Step ups to 8 inch height with good form x30 reps pain free.   Lateral tap down off 6 inch height with glute med weakness noted, no pain.     FOTO 72/100 (+16 point change from [facility])    Goal/Measure of Progress Goal Met? 1. Patient will be independent and compliant with HEP in order to progress toward long term goals   Status at last Eval: Not compliant Current Status: 6/21: goal progressing; advanced HEP today yes   2. Patient will demonstrate >=5/5 strength in R quad for improved ability to navigate stairs   Status at last Eval: 4+/5 Current Status: Unable to assess due to pt not returning for any remaining f/u's  no   3. Patient will report worst knee pain as 2/10 or less in order to progress toward personal goals. Status at last Eval: 3/10 (when lying down to sleep) Current Status: Pt reports no reoccurrence of pain over the past week yes     Goal/Measure of Progress Goal Met? 4. Patient will report ability to walk for 5 miles for general health   Status at last Eval: 3 miles, no pain issues Current Status: Pt has increased to 5 miles for 2 consecutive days with community walking program since last treatment yes         RECOMMENDATIONS  Discharge from therapy at this time due to failure to return for any remaining follow ups after completing 9 of 9-13 prescribed visits. If you have any questions/comments please contact us directly at [phone]. Thank you for allowing us to assist in the care of your patient.     Therapist Signature: Nakia Vásquez, PTA Date: 9/9/21    [place]ous[place] DPT Time: 1:40 PM